# Patient Record
Sex: FEMALE | Race: WHITE | NOT HISPANIC OR LATINO
[De-identification: names, ages, dates, MRNs, and addresses within clinical notes are randomized per-mention and may not be internally consistent; named-entity substitution may affect disease eponyms.]

---

## 2019-11-12 PROBLEM — Z00.00 ENCOUNTER FOR PREVENTIVE HEALTH EXAMINATION: Status: ACTIVE | Noted: 2019-11-12

## 2019-11-19 ENCOUNTER — APPOINTMENT (OUTPATIENT)
Dept: SURGICAL ONCOLOGY | Facility: CLINIC | Age: 39
End: 2019-11-19
Payer: COMMERCIAL

## 2019-11-19 ENCOUNTER — OUTPATIENT (OUTPATIENT)
Dept: OUTPATIENT SERVICES | Facility: HOSPITAL | Age: 39
LOS: 1 days | End: 2019-11-19
Payer: COMMERCIAL

## 2019-11-19 VITALS
DIASTOLIC BLOOD PRESSURE: 65 MMHG | SYSTOLIC BLOOD PRESSURE: 106 MMHG | TEMPERATURE: 98.4 F | HEART RATE: 62 BPM | WEIGHT: 144 LBS | HEIGHT: 69 IN | BODY MASS INDEX: 21.33 KG/M2

## 2019-11-19 DIAGNOSIS — Z80.9 FAMILY HISTORY OF MALIGNANT NEOPLASM, UNSPECIFIED: ICD-10-CM

## 2019-11-19 DIAGNOSIS — Z01.818 ENCOUNTER FOR OTHER PREPROCEDURAL EXAMINATION: ICD-10-CM

## 2019-11-19 DIAGNOSIS — Z86.2 PERSONAL HISTORY OF DISEASES OF THE BLOOD AND BLOOD-FORMING ORGANS AND CERTAIN DISORDERS INVOLVING THE IMMUNE MECHANISM: ICD-10-CM

## 2019-11-19 DIAGNOSIS — F17.200 NICOTINE DEPENDENCE, UNSPECIFIED, UNCOMPLICATED: ICD-10-CM

## 2019-11-19 PROCEDURE — 93010 ELECTROCARDIOGRAM REPORT: CPT

## 2019-11-19 PROCEDURE — 99204 OFFICE O/P NEW MOD 45 MIN: CPT

## 2019-11-19 PROCEDURE — 93005 ELECTROCARDIOGRAM TRACING: CPT

## 2019-11-20 LAB
ABO + RH PNL BLD: NORMAL
ALBUMIN SERPL ELPH-MCNC: 4.9 G/DL
ALP BLD-CCNC: 53 U/L
ALT SERPL-CCNC: 11 U/L
ANION GAP SERPL CALC-SCNC: 16 MMOL/L
APTT BLD: 31.4 SEC
AST SERPL-CCNC: 14 U/L
BASOPHILS # BLD AUTO: 0.03 K/UL
BASOPHILS NFR BLD AUTO: 0.3 %
BILIRUB DIRECT SERPL-MCNC: 0.1 MG/DL
BILIRUB INDIRECT SERPL-MCNC: 0.4 MG/DL
BILIRUB SERPL-MCNC: 0.6 MG/DL
BUN SERPL-MCNC: 7 MG/DL
CALCIUM SERPL-MCNC: 10 MG/DL
CANCER AG19-9 SERPL-ACNC: 5 U/ML
CHLORIDE SERPL-SCNC: 104 MMOL/L
CO2 SERPL-SCNC: 22 MMOL/L
CREAT SERPL-MCNC: 0.62 MG/DL
EOSINOPHIL # BLD AUTO: 0.09 K/UL
EOSINOPHIL NFR BLD AUTO: 0.9 %
GLUCOSE SERPL-MCNC: 77 MG/DL
HCT VFR BLD CALC: 42.9 %
HGB BLD-MCNC: 13.4 G/DL
IMM GRANULOCYTES NFR BLD AUTO: 0.3 %
INR PPP: 1.02 RATIO
LYMPHOCYTES # BLD AUTO: 2.14 K/UL
LYMPHOCYTES NFR BLD AUTO: 22.4 %
MAN DIFF?: NORMAL
MCHC RBC-ENTMCNC: 30.2 PG
MCHC RBC-ENTMCNC: 31.2 GM/DL
MCV RBC AUTO: 96.8 FL
MONOCYTES # BLD AUTO: 0.56 K/UL
MONOCYTES NFR BLD AUTO: 5.9 %
NEUTROPHILS # BLD AUTO: 6.7 K/UL
NEUTROPHILS NFR BLD AUTO: 70.2 %
PLATELET # BLD AUTO: 138 K/UL
POTASSIUM SERPL-SCNC: 4.4 MMOL/L
PROT SERPL-MCNC: 7.1 G/DL
PT BLD: 11.7 SEC
RBC # BLD: 4.43 M/UL
RBC # FLD: 16.3 %
SODIUM SERPL-SCNC: 142 MMOL/L
WBC # FLD AUTO: 9.55 K/UL

## 2019-11-21 LAB — HCG SERPL-MCNC: <1 MIU/ML

## 2019-11-26 NOTE — PHYSICAL EXAM
[Normal] : supple, no neck mass and thyroid not enlarged [Normal] : oriented to person, place and time, with appropriate affect [de-identified] : Anicteric [de-identified] : S1,S2, regular rate and rhythm. No murmurs heard. [de-identified] : Clear throughout. No wheezes heard. [de-identified] : no palpable masses [de-identified] : warm and dry

## 2019-11-26 NOTE — RESULTS/DATA
[FreeTextEntry1] : Diagnostic Studies\par \par Date: 11/11/19\par Study: CT AP [facility] Pancreas Protocol ([facility])\par Results:  (1) 9.7x7.7cm cystic mass most likely arising from the tail of the pancreas. 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 (2) Otherwise unremarkable exam\par \par Date: 10/28/19\par Study: MR I Abdomen [facility]\par Results: Large cystic lesion within upper abdomen, as described above, indeterminate origin, possibly pancreatic origin. Recommend CT AP --- There is a T2 bright lesion measuring 6.6x8.6x7.9cm, insinuating itself between the left hepatic lobe, spleen, pancreas and stomach. The lesion is septated, There is a suggestion of a mural nodule, heterogenous. The liver is normal in size without focal hepatic lesions. No intrahepatic ductal dilatation is seen. The hepatic and portal veins are not well seen.  The gallbladder, common bile duct, pancreas, kidneys and adrenal glands are grossly unremarkable. Enlarged spleen measures 14.4cm in its longest span. The visualized bowel is unremarkable. No abdominal ascites or lymphadenopathy is identified. No signal abnormality related to osseous structures.\par \par \par Date:\par Study:\par Results:\par \par \par Date:\par Study:\par Results:\par \par Labs:\par \par Date:\par Results:\par \par Pathology:\par \par Date:\par Results:\par \par

## 2019-11-26 NOTE — ASSESSMENT
[FreeTextEntry1] : I) cystic pancreas mass\par \par P) Long discussion w patient about findings.Has a large cystic mass w nodules. 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 treatment of choice is resection. Would need a distal pancreatectomy and possible splenectomy. Will attempt minimally invasively but lesion is quite large and would not want to risk rupture so high likelihood of opening. Risks and benefits discussed, including but not limited to post bleeding, infection, pancreas fistula. All questions answered.Arrangements to be made.\par \par \par Reinaldo Burk MD\par \par Chief Surgical Oncology\par Multidisciplinary GI cancer program\par [facility] [place]\par [facility]\par \par Professor Surgery\par [employer]\par

## 2019-11-26 NOTE — HISTORY OF PRESENT ILLNESS
[de-identified] : Patient Name: AGNES REYES \par MRN: [id number] \par [place] MRN:\par Referring Provider:  Dr. Vj Garduno\par Oncologist:n/a\par Date: Nov 14, 2019 \par \par Diagnosis: Pancreas mass\par \par 40 yo for evaluation of a pancreas mass. Patient reports she had an abdominal ultrasound for unclear reasons and was told there was a mass on the left side of her abdomen. She was referred for MRI which was done 10/28 and this revealed a large cystic lesion in the upper abdomen possibly pancreatic origin. She was then referred for CT scan, done 11/11/19 revealing a 9.7cm cystic mass most likely arising from the tail of the pancreas. She has had no further testing and is now here for surgical consultation.\par \par Curently, Ms. REYES denies abdominal pain and discomfort, denies having decreased appetite, and denies nausea or vomiting. She denies changes in bowel habits and denies recent unintentional weight loss. She denies fevers, chills, or night sweats.\par \par Functional Status: Ms. REYES is able to walk 20 blocks without fatigue or dyspnea.\par

## 2019-12-03 VITALS
SYSTOLIC BLOOD PRESSURE: 108 MMHG | HEART RATE: 50 BPM | OXYGEN SATURATION: 98 % | WEIGHT: 142.2 LBS | RESPIRATION RATE: 16 BRPM | HEIGHT: 69 IN | TEMPERATURE: 98 F | DIASTOLIC BLOOD PRESSURE: 62 MMHG

## 2019-12-04 ENCOUNTER — RESULT REVIEW (OUTPATIENT)
Age: 39
End: 2019-12-04

## 2019-12-04 ENCOUNTER — INPATIENT (INPATIENT)
Facility: HOSPITAL | Age: 39
LOS: 3 days | Discharge: HOME | DRG: 407 | End: 2019-12-08
Attending: SURGERY | Admitting: SURGERY
Payer: COMMERCIAL

## 2019-12-04 ENCOUNTER — APPOINTMENT (OUTPATIENT)
Dept: SURGICAL ONCOLOGY | Facility: HOSPITAL | Age: 39
End: 2019-12-04

## 2019-12-04 LAB
ANION GAP SERPL CALC-SCNC: 11 MMOL/L — SIGNIFICANT CHANGE UP (ref 5–17)
BUN SERPL-MCNC: 5 MG/DL — LOW (ref 7–23)
CALCIUM SERPL-MCNC: 8.8 MG/DL — SIGNIFICANT CHANGE UP (ref 8.4–10.5)
CHLORIDE SERPL-SCNC: 105 MMOL/L — SIGNIFICANT CHANGE UP (ref 96–108)
CO2 SERPL-SCNC: 23 MMOL/L — SIGNIFICANT CHANGE UP (ref 22–31)
CREAT SERPL-MCNC: 0.61 MG/DL — SIGNIFICANT CHANGE UP (ref 0.5–1.3)
GLUCOSE SERPL-MCNC: 158 MG/DL — HIGH (ref 70–99)
HCT VFR BLD CALC: 40.3 % — SIGNIFICANT CHANGE UP (ref 34.5–45)
HGB BLD-MCNC: 12.9 G/DL — SIGNIFICANT CHANGE UP (ref 11.5–15.5)
MAGNESIUM SERPL-MCNC: 1.6 MG/DL — SIGNIFICANT CHANGE UP (ref 1.6–2.6)
MCHC RBC-ENTMCNC: 31 PG — SIGNIFICANT CHANGE UP (ref 27–34)
MCHC RBC-ENTMCNC: 32 GM/DL — SIGNIFICANT CHANGE UP (ref 32–36)
MCV RBC AUTO: 96.9 FL — SIGNIFICANT CHANGE UP (ref 80–100)
PHOSPHATE SERPL-MCNC: 2 MG/DL — LOW (ref 2.5–4.5)
PLATELET # BLD AUTO: 170 K/UL — SIGNIFICANT CHANGE UP (ref 150–400)
POTASSIUM SERPL-MCNC: 4 MMOL/L — SIGNIFICANT CHANGE UP (ref 3.5–5.3)
POTASSIUM SERPL-SCNC: 4 MMOL/L — SIGNIFICANT CHANGE UP (ref 3.5–5.3)
RBC # BLD: 4.16 M/UL — SIGNIFICANT CHANGE UP (ref 3.8–5.2)
RBC # FLD: 14.9 % — HIGH (ref 10.3–14.5)
SODIUM SERPL-SCNC: 139 MMOL/L — SIGNIFICANT CHANGE UP (ref 135–145)
WBC # BLD: 23.65 K/UL — HIGH (ref 3.8–10.5)
WBC # FLD AUTO: 23.65 K/UL — HIGH (ref 3.8–10.5)

## 2019-12-04 PROCEDURE — 88342 IMHCHEM/IMCYTCHM 1ST ANTB: CPT | Mod: 26

## 2019-12-04 PROCEDURE — 48999 UNLISTED PROCEDURE PANCREAS: CPT

## 2019-12-04 PROCEDURE — 88341 IMHCHEM/IMCYTCHM EA ADD ANTB: CPT | Mod: 26

## 2019-12-04 PROCEDURE — 88309 TISSUE EXAM BY PATHOLOGIST: CPT | Mod: 26

## 2019-12-04 PROCEDURE — 88305 TISSUE EXAM BY PATHOLOGIST: CPT | Mod: 26

## 2019-12-04 RX ORDER — DIPHENHYDRAMINE HCL 50 MG
25 CAPSULE ORAL ONCE
Refills: 0 | Status: COMPLETED | OUTPATIENT
Start: 2019-12-04 | End: 2019-12-04

## 2019-12-04 RX ORDER — HEPARIN SODIUM 5000 [USP'U]/ML
5000 INJECTION INTRAVENOUS; SUBCUTANEOUS EVERY 8 HOURS
Refills: 0 | Status: DISCONTINUED | OUTPATIENT
Start: 2019-12-04 | End: 2019-12-08

## 2019-12-04 RX ORDER — ONDANSETRON 8 MG/1
4 TABLET, FILM COATED ORAL EVERY 4 HOURS
Refills: 0 | Status: DISCONTINUED | OUTPATIENT
Start: 2019-12-04 | End: 2019-12-06

## 2019-12-04 RX ORDER — SODIUM CHLORIDE 9 MG/ML
1000 INJECTION, SOLUTION INTRAVENOUS
Refills: 0 | Status: DISCONTINUED | OUTPATIENT
Start: 2019-12-04 | End: 2019-12-08

## 2019-12-04 RX ORDER — HYDROMORPHONE HYDROCHLORIDE 2 MG/ML
0.3 INJECTION INTRAMUSCULAR; INTRAVENOUS; SUBCUTANEOUS
Refills: 0 | Status: DISCONTINUED | OUTPATIENT
Start: 2019-12-04 | End: 2019-12-05

## 2019-12-04 RX ORDER — BUPIVACAINE 13.3 MG/ML
20 INJECTION, SUSPENSION, LIPOSOMAL INFILTRATION ONCE
Refills: 0 | Status: DISCONTINUED | OUTPATIENT
Start: 2019-12-04 | End: 2019-12-04

## 2019-12-04 RX ORDER — METOPROLOL TARTRATE 50 MG
0 TABLET ORAL
Qty: 0 | Refills: 0 | DISCHARGE

## 2019-12-04 RX ADMIN — HYDROMORPHONE HYDROCHLORIDE 0.3 MILLIGRAM(S): 2 INJECTION INTRAMUSCULAR; INTRAVENOUS; SUBCUTANEOUS at 19:18

## 2019-12-04 RX ADMIN — HYDROMORPHONE HYDROCHLORIDE 0.3 MILLIGRAM(S): 2 INJECTION INTRAMUSCULAR; INTRAVENOUS; SUBCUTANEOUS at 19:33

## 2019-12-04 RX ADMIN — Medication 25 MILLIGRAM(S): at 23:07

## 2019-12-04 NOTE — PROGRESS NOTE ADULT - SUBJECTIVE AND OBJECTIVE BOX
Team 1 Surgery Post-Op Note, PCN:     Pre-Op Dx: neoplasm of pancreas  Procedure: Laparoscopic distal pancreatectomy with splenectomy    Surgeon: Wm    Subjective:  Pt seen and examined at bedside.  Pt is doing well, resting in bed. Pt reporting some back discomfort from bed. Pt denies abdominal pain, CP, SOB, nausea, vomiting.       Vital Signs Last 24 Hrs  T(C): 37.1 (04 Dec 2019 21:30), Max: 37.1 (04 Dec 2019 21:30)  T(F): 98.7 (04 Dec 2019 21:30), Max: 98.7 (04 Dec 2019 21:30)  HR: 51 (04 Dec 2019 21:30) (46 - 60)  BP: 119/69 (04 Dec 2019 21:30) (118/70 - 140/72)  RR: 16 (04 Dec 2019 21:30) (15 - 17)  SpO2: 95% (04 Dec 2019 21:30) (95% - 99%)    Physical Exam:  General: NAD, resting comfortably in bed  HEENT: NGT in place with minimal output  Pulmonary: Nonlabored breathing, no respiratory distress  Abdominal: soft, NT/ND, incisions clean dry and intact. ONELIA in place with serosanguinous output   Extremities: WWP, SCDs in place.     LABS:                        12.9   23.65 )-----------( 170      ( 04 Dec 2019 19:10 )             40.3     12-04    139  |  105  |  5<L>  ----------------------------<  158<H>  4.0   |  23  |  0.61    Ca    8.8      04 Dec 2019 19:10  Phos  2.0     12-04  Mg     1.6     12-04      ABO Interpretation: B (12-04 @ 10:28)

## 2019-12-04 NOTE — BRIEF OPERATIVE NOTE - OPERATION/FINDINGS
Candy abdmoinal access, placement of ports under direct vis, liver retractor placed. Entered lesser sac. Dissected pancreas off retroperitoneum. Splenic artery and vein ligated/transected with 45mm tan vascular stapler. Pancreas transected with 60mm purpler endogia stapler. Hemostasis obtained. Specimen retrieved via endocatch bag in Pfannenstiel incision. 19Fr hansa drain placed in pancreatic bed. Abdomen closed primarily.

## 2019-12-04 NOTE — H&P ADULT - ASSESSMENT
Pt is a 38 y/o F with iron deficiency anemia that presents today for a laparoscopic distal pancreatectomy and possible splenectomy. Pt underwent a routine abdominal sonogram in which they discovered a pancreatic mass. 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 No complaints today  - to OR   - admit to surgery team 1

## 2019-12-04 NOTE — H&P ADULT - HISTORY OF PRESENT ILLNESS
Pt is a 38 y/o F with iron deficiency anemia that presents today for a laparoscopic distal pancreatectomy and possible splenectomy. Pt underwent a routine abdominal sonogram in which they discovered a pancreatic mass. 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 No complaints today

## 2019-12-04 NOTE — BRIEF OPERATIVE NOTE - NSICDXBRIEFPROCEDURE_GEN_ALL_CORE_FT
PROCEDURES:  Laparoscopic distal pancreatectomy with splenectomy 04-Dec-2019 18:03:59  Javier Sutherland

## 2019-12-04 NOTE — PACU DISCHARGE NOTE - COMMENTS
Pt A&ox4, operative sites clean, dry and intact with 6 lap sites and one roberto to left lower abdomen; dilauded iv dose given one time post-op with verbalized comfort at present; plan of care endorsed to ANURADHA Kaye--dispo regional 3626.1

## 2019-12-05 LAB
ANION GAP SERPL CALC-SCNC: 9 MMOL/L — SIGNIFICANT CHANGE UP (ref 5–17)
BASOPHILS # BLD AUTO: 0.01 K/UL — SIGNIFICANT CHANGE UP (ref 0–0.2)
BASOPHILS NFR BLD AUTO: 0.1 % — SIGNIFICANT CHANGE UP (ref 0–2)
BUN SERPL-MCNC: 6 MG/DL — LOW (ref 7–23)
CALCIUM SERPL-MCNC: 8.7 MG/DL — SIGNIFICANT CHANGE UP (ref 8.4–10.5)
CHLORIDE SERPL-SCNC: 105 MMOL/L — SIGNIFICANT CHANGE UP (ref 96–108)
CO2 SERPL-SCNC: 26 MMOL/L — SIGNIFICANT CHANGE UP (ref 22–31)
CREAT SERPL-MCNC: 0.55 MG/DL — SIGNIFICANT CHANGE UP (ref 0.5–1.3)
EOSINOPHIL # BLD AUTO: 0 K/UL — SIGNIFICANT CHANGE UP (ref 0–0.5)
EOSINOPHIL NFR BLD AUTO: 0 % — SIGNIFICANT CHANGE UP (ref 0–6)
GLUCOSE SERPL-MCNC: 134 MG/DL — HIGH (ref 70–99)
HCT VFR BLD CALC: 38.8 % — SIGNIFICANT CHANGE UP (ref 34.5–45)
HGB BLD-MCNC: 12.2 G/DL — SIGNIFICANT CHANGE UP (ref 11.5–15.5)
IMM GRANULOCYTES NFR BLD AUTO: 0.4 % — SIGNIFICANT CHANGE UP (ref 0–1.5)
LYMPHOCYTES # BLD AUTO: 1.9 K/UL — SIGNIFICANT CHANGE UP (ref 1–3.3)
LYMPHOCYTES # BLD AUTO: 4 % — LOW (ref 13–44)
MAGNESIUM SERPL-MCNC: 1.7 MG/DL — SIGNIFICANT CHANGE UP (ref 1.6–2.6)
MCHC RBC-ENTMCNC: 30.3 PG — SIGNIFICANT CHANGE UP (ref 27–34)
MCHC RBC-ENTMCNC: 31.4 GM/DL — LOW (ref 32–36)
MCV RBC AUTO: 96.5 FL — SIGNIFICANT CHANGE UP (ref 80–100)
MONOCYTES # BLD AUTO: 1.88 K/UL — HIGH (ref 0–0.9)
MONOCYTES NFR BLD AUTO: 7 % — SIGNIFICANT CHANGE UP (ref 2–14)
NEUTROPHILS # BLD AUTO: 13.54 K/UL — HIGH (ref 1.8–7.4)
NEUTROPHILS NFR BLD AUTO: 89 % — HIGH (ref 43–77)
NRBC # BLD: 0 /100 WBCS — SIGNIFICANT CHANGE UP (ref 0–0)
PHOSPHATE SERPL-MCNC: 2.2 MG/DL — LOW (ref 2.5–4.5)
PLATELET # BLD AUTO: 172 K/UL — SIGNIFICANT CHANGE UP (ref 150–400)
POTASSIUM SERPL-MCNC: 3.8 MMOL/L — SIGNIFICANT CHANGE UP (ref 3.5–5.3)
POTASSIUM SERPL-SCNC: 3.8 MMOL/L — SIGNIFICANT CHANGE UP (ref 3.5–5.3)
RBC # BLD: 4.02 M/UL — SIGNIFICANT CHANGE UP (ref 3.8–5.2)
RBC # FLD: 15 % — HIGH (ref 10.3–14.5)
SODIUM SERPL-SCNC: 140 MMOL/L — SIGNIFICANT CHANGE UP (ref 135–145)
WBC # BLD: 17.4 K/UL — HIGH (ref 3.8–10.5)
WBC # FLD AUTO: 17.4 K/UL — HIGH (ref 3.8–10.5)

## 2019-12-05 RX ORDER — BENZOCAINE 10 %
1 GEL (GRAM) MUCOUS MEMBRANE ONCE
Refills: 0 | Status: COMPLETED | OUTPATIENT
Start: 2019-12-05 | End: 2019-12-05

## 2019-12-05 RX ORDER — POTASSIUM CHLORIDE 20 MEQ
10 PACKET (EA) ORAL
Refills: 0 | Status: DISCONTINUED | OUTPATIENT
Start: 2019-12-05 | End: 2019-12-05

## 2019-12-05 RX ORDER — HYDROMORPHONE HYDROCHLORIDE 2 MG/ML
30 INJECTION INTRAMUSCULAR; INTRAVENOUS; SUBCUTANEOUS
Refills: 0 | Status: DISCONTINUED | OUTPATIENT
Start: 2019-12-05 | End: 2019-12-06

## 2019-12-05 RX ORDER — POTASSIUM PHOSPHATE, MONOBASIC POTASSIUM PHOSPHATE, DIBASIC 236; 224 MG/ML; MG/ML
15 INJECTION, SOLUTION INTRAVENOUS ONCE
Refills: 0 | Status: COMPLETED | OUTPATIENT
Start: 2019-12-05 | End: 2019-12-05

## 2019-12-05 RX ORDER — MAGNESIUM SULFATE 500 MG/ML
2 VIAL (ML) INJECTION ONCE
Refills: 0 | Status: COMPLETED | OUTPATIENT
Start: 2019-12-05 | End: 2019-12-05

## 2019-12-05 RX ORDER — PANTOPRAZOLE SODIUM 20 MG/1
40 TABLET, DELAYED RELEASE ORAL DAILY
Refills: 0 | Status: DISCONTINUED | OUTPATIENT
Start: 2019-12-05 | End: 2019-12-08

## 2019-12-05 RX ORDER — NALOXONE HYDROCHLORIDE 4 MG/.1ML
0.1 SPRAY NASAL
Refills: 0 | Status: DISCONTINUED | OUTPATIENT
Start: 2019-12-05 | End: 2019-12-08

## 2019-12-05 RX ADMIN — SODIUM CHLORIDE 100 MILLILITER(S): 9 INJECTION, SOLUTION INTRAVENOUS at 21:03

## 2019-12-05 RX ADMIN — POTASSIUM PHOSPHATE, MONOBASIC POTASSIUM PHOSPHATE, DIBASIC 63.75 MILLIMOLE(S): 236; 224 INJECTION, SOLUTION INTRAVENOUS at 12:47

## 2019-12-05 RX ADMIN — HEPARIN SODIUM 5000 UNIT(S): 5000 INJECTION INTRAVENOUS; SUBCUTANEOUS at 13:28

## 2019-12-05 RX ADMIN — HEPARIN SODIUM 5000 UNIT(S): 5000 INJECTION INTRAVENOUS; SUBCUTANEOUS at 05:33

## 2019-12-05 RX ADMIN — HEPARIN SODIUM 5000 UNIT(S): 5000 INJECTION INTRAVENOUS; SUBCUTANEOUS at 21:02

## 2019-12-05 RX ADMIN — Medication 50 GRAM(S): at 05:32

## 2019-12-05 RX ADMIN — SODIUM CHLORIDE 100 MILLILITER(S): 9 INJECTION, SOLUTION INTRAVENOUS at 05:35

## 2019-12-05 RX ADMIN — ONDANSETRON 4 MILLIGRAM(S): 8 TABLET, FILM COATED ORAL at 09:49

## 2019-12-05 RX ADMIN — HYDROMORPHONE HYDROCHLORIDE 30 MILLILITER(S): 2 INJECTION INTRAMUSCULAR; INTRAVENOUS; SUBCUTANEOUS at 12:26

## 2019-12-05 RX ADMIN — PANTOPRAZOLE SODIUM 40 MILLIGRAM(S): 20 TABLET, DELAYED RELEASE ORAL at 12:47

## 2019-12-05 RX ADMIN — HYDROMORPHONE HYDROCHLORIDE 0.3 MILLIGRAM(S): 2 INJECTION INTRAMUSCULAR; INTRAVENOUS; SUBCUTANEOUS at 00:36

## 2019-12-05 RX ADMIN — HYDROMORPHONE HYDROCHLORIDE 0.3 MILLIGRAM(S): 2 INJECTION INTRAMUSCULAR; INTRAVENOUS; SUBCUTANEOUS at 05:34

## 2019-12-05 RX ADMIN — HYDROMORPHONE HYDROCHLORIDE 0.3 MILLIGRAM(S): 2 INJECTION INTRAMUSCULAR; INTRAVENOUS; SUBCUTANEOUS at 01:15

## 2019-12-05 NOTE — PHYSICAL THERAPY INITIAL EVALUATION ADULT - PHYSICAL ASSIST/NONPHYSICAL ASSIST, REHAB EVAL
1 person assist/verbal cues/HOB slightly elevated. VC's and hand held assist for log rolling to decrease discomfort with mobility/nonverbal cues (demo/gestures)

## 2019-12-05 NOTE — PHYSICAL THERAPY INITIAL EVALUATION ADULT - CRITERIA FOR SKILLED THERAPEUTIC INTERVENTIONS
functional limitations in following categories/risk reduction/prevention/anticipated discharge recommendation/rehab potential/therapy frequency/predicted duration of therapy intervention/anticipated equipment needs at discharge/impairments found

## 2019-12-05 NOTE — PHYSICAL THERAPY INITIAL EVALUATION ADULT - GENERAL OBSERVATIONS, REHAB EVAL
Chart reviewed, ANURADHA Doe cleared pt for PT, IE completed. Admitted for laparoscopic distal pancreatectomy and splenectomy 12/04/19. Recv'd mod supine, +NGT to wall suction, +JPx1, +yee, +abdominal dressing C/D/I, +heplock IV, pt denies SOB at rest or c activity, c/o nausea, 6/10 abdominal pain, & dizziness at rest. Naila session well, CG bed mobility, CG tranfers c hand held assist, CG amb 75ft x2 UE assist on IV pole. Impairments: dec balance, dec posture. Pt left in bedside chair as found lines/drains in tact, no new complaints. ANURADHA Doe aware. Pt will benefit from 1x f/u with PT. FIM gait=2. Chart reviewed, ANURADHA Doe cleared pt for PT, IE completed. Admitted for lap distal pancreatectomy and splenectomy 12/04/19. Recv'd mod supine, +NGT to wall suction, +JPx1, +yee, +abdominal dressing C/D/I, +heplock IV, pt denies SOB at rest or c activity, c/o nausea, 6/10 abdominal pain, & dizziness at rest. Naila session well, CG bed mobility, CG tranfers c hand held assist, CG amb 75ft x2 UE assist on IV pole. Impairments: pain, dec balance, dec posture. Pt left in bedside chair ...

## 2019-12-05 NOTE — PHYSICAL THERAPY INITIAL EVALUATION ADULT - PREDICTED DURATION OF THERAPY (DAYS/WKS), PT EVAL
Pt will benefit from 1 additional PT f/u to prevent deconditioning Pt will benefit from 1 additional PT f/u to maximize mobility and safety

## 2019-12-05 NOTE — PROGRESS NOTE ADULT - SUBJECTIVE AND OBJECTIVE BOX
INTERVAL HPI/OVERNIGHT EVENTS: NAEO. Post op labs stable. AFVSS. NGT in place.    STATUS POST:  distal pancreatectomy and splenectomy 12/4    POST OPERATIVE DAY #: 1    SUBJECTIVE: Pt seen and examined at bedside this am by surgery team. Pain is well controlled however wishes for NGT to come out. Will monitor OP. Denies f/n/v/cp/sob.    MEDICATIONS  (STANDING):  benzocaine 20% Spray 1 Spray(s) Topical once  heparin  Injectable 5000 Unit(s) SubCutaneous every 8 hours  HYDROmorphone PCA (1 mG/mL) 30 milliLiter(s) PCA Continuous PCA Continuous  lactated ringers. 1000 milliLiter(s) (100 mL/Hr) IV Continuous <Continuous>  pantoprazole  Injectable 40 milliGRAM(s) IV Push daily  potassium chloride  10 mEq/100 mL IVPB 10 milliEquivalent(s) IV Intermittent every 1 hour  potassium phosphate IVPB 15 milliMole(s) IV Intermittent once    MEDICATIONS  (PRN):  naloxone Injectable 0.1 milliGRAM(s) IV Push every 3 minutes PRN For ANY of the following changes in patient status:  A. RR LESS THAN 10 breaths per minute, B. Oxygen saturation LESS THAN 90%, C. Sedation score of 6  ondansetron Injectable 4 milliGRAM(s) IV Push every 4 hours PRN Nausea and/or Vomiting      Vital Signs Last 24 Hrs  T(C): 36.8 (05 Dec 2019 09:00), Max: 37.1 (04 Dec 2019 21:30)  T(F): 98.2 (05 Dec 2019 09:00), Max: 98.7 (04 Dec 2019 21:30)  HR: 60 (05 Dec 2019 09:00) (46 - 60)  BP: 120/78 (05 Dec 2019 09:00) (116/74 - 140/72)  BP(mean): --  RR: 16 (05 Dec 2019 09:00) (15 - 17)  SpO2: 95% (05 Dec 2019 05:34) (95% - 99%)    PHYSICAL EXAM:    Constitutional: A&Ox3, NAD    Respiratory: non labored breathing, no respiratory distress    Cardiovascular: NSR, RRR    Gastrointestinal: soft, NT/ND, incisions clean dry and intact. ONELIA in place with serosanguinous output     Genitourinary: Kenney in place draining clear yellow urine     Extremities: wwp, no calf tenderness or edema. SCDs in place      I&O's Detail    04 Dec 2019 07:01  -  05 Dec 2019 07:00  --------------------------------------------------------  IN:    IV PiggyBack: 50 mL    lactated ringers.: 1100 mL  Total IN: 1150 mL    OUT:    Bulb: 70 mL    Indwelling Catheter - Urethral: 295 mL    Nasoenteral Tube: 300 mL  Total OUT: 665 mL    Total NET: 485 mL      05 Dec 2019 07:01  -  05 Dec 2019 10:30  --------------------------------------------------------  IN:  Total IN: 0 mL    OUT:    Indwelling Catheter - Urethral: 500 mL  Total OUT: 500 mL    Total NET: -500 mL          LABS:                        12.2   17.40 )-----------( 172      ( 05 Dec 2019 06:25 )             38.8     12-05    140  |  105  |  6<L>  ----------------------------<  134<H>  3.8   |  26  |  0.55    Ca    8.7      05 Dec 2019 06:25  Phos  2.0     12-04  Mg     1.6     12-04            RADIOLOGY & ADDITIONAL STUDIES:

## 2019-12-05 NOTE — PHYSICAL THERAPY INITIAL EVALUATION ADULT - ADDITIONAL COMMENTS
Pt previously indpt with all activities, lives with 12 yo daughter in apartment building and elevator access. Pt states that she can get help if she needs it post [facility] d/c. Pt states she works (did not specify job title, "same as you"), is frequently in the community, walks to get around her neighborhood. Pt previously indpt with all activities, lives with 14 yo daughter in apartment building and elevator access. Pt states that she can get help if she needs it post [facility] d/c. Pt states she works (did not specify job title, "same as you"), is frequently in the community, community ambulator without limitation.

## 2019-12-05 NOTE — PHYSICAL THERAPY INITIAL EVALUATION ADULT - PERTINENT HX OF CURRENT PROBLEM, REHAB EVAL
38 y/o F with iron deficiency anemia that presents today for a laparoscopic distal pancreatectomy and possible splenectomy. Pt underwent a routine abdominal sonogram in which they discovered a pancreatic mass. 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

## 2019-12-06 LAB
ALBUMIN SERPL ELPH-MCNC: 3.7 G/DL — SIGNIFICANT CHANGE UP (ref 3.3–5)
ALP SERPL-CCNC: 54 U/L — SIGNIFICANT CHANGE UP (ref 40–120)
ALT FLD-CCNC: 11 U/L — SIGNIFICANT CHANGE UP (ref 10–45)
ANION GAP SERPL CALC-SCNC: 10 MMOL/L — SIGNIFICANT CHANGE UP (ref 5–17)
AST SERPL-CCNC: 14 U/L — SIGNIFICANT CHANGE UP (ref 10–40)
BASOPHILS # BLD AUTO: 0.03 K/UL — SIGNIFICANT CHANGE UP (ref 0–0.2)
BASOPHILS NFR BLD AUTO: 0.1 % — SIGNIFICANT CHANGE UP (ref 0–2)
BILIRUB SERPL-MCNC: 1 MG/DL — SIGNIFICANT CHANGE UP (ref 0.2–1.2)
BUN SERPL-MCNC: 5 MG/DL — LOW (ref 7–23)
CALCIUM SERPL-MCNC: 8.5 MG/DL — SIGNIFICANT CHANGE UP (ref 8.4–10.5)
CHLORIDE SERPL-SCNC: 101 MMOL/L — SIGNIFICANT CHANGE UP (ref 96–108)
CO2 SERPL-SCNC: 26 MMOL/L — SIGNIFICANT CHANGE UP (ref 22–31)
CREAT SERPL-MCNC: 0.54 MG/DL — SIGNIFICANT CHANGE UP (ref 0.5–1.3)
EOSINOPHIL # BLD AUTO: 0 K/UL — SIGNIFICANT CHANGE UP (ref 0–0.5)
EOSINOPHIL NFR BLD AUTO: 0 % — SIGNIFICANT CHANGE UP (ref 0–6)
GLUCOSE SERPL-MCNC: 105 MG/DL — HIGH (ref 70–99)
HCT VFR BLD CALC: 38.1 % — SIGNIFICANT CHANGE UP (ref 34.5–45)
HGB BLD-MCNC: 12.3 G/DL — SIGNIFICANT CHANGE UP (ref 11.5–15.5)
IMM GRANULOCYTES NFR BLD AUTO: 0.6 % — SIGNIFICANT CHANGE UP (ref 0–1.5)
LYMPHOCYTES # BLD AUTO: 10.9 % — LOW (ref 13–44)
LYMPHOCYTES # BLD AUTO: 2.51 K/UL — SIGNIFICANT CHANGE UP (ref 1–3.3)
MAGNESIUM SERPL-MCNC: 1.6 MG/DL — SIGNIFICANT CHANGE UP (ref 1.6–2.6)
MCHC RBC-ENTMCNC: 31.2 PG — SIGNIFICANT CHANGE UP (ref 27–34)
MCHC RBC-ENTMCNC: 32.3 GM/DL — SIGNIFICANT CHANGE UP (ref 32–36)
MCV RBC AUTO: 96.7 FL — SIGNIFICANT CHANGE UP (ref 80–100)
MONOCYTES # BLD AUTO: 2.61 K/UL — HIGH (ref 0–0.9)
MONOCYTES NFR BLD AUTO: 11.3 % — SIGNIFICANT CHANGE UP (ref 2–14)
NEUTROPHILS # BLD AUTO: 17.85 K/UL — HIGH (ref 1.8–7.4)
NEUTROPHILS NFR BLD AUTO: 77.1 % — HIGH (ref 43–77)
NRBC # BLD: 0 /100 WBCS — SIGNIFICANT CHANGE UP (ref 0–0)
PHOSPHATE SERPL-MCNC: 1.3 MG/DL — LOW (ref 2.5–4.5)
PLATELET # BLD AUTO: 189 K/UL — SIGNIFICANT CHANGE UP (ref 150–400)
POTASSIUM SERPL-MCNC: 3.8 MMOL/L — SIGNIFICANT CHANGE UP (ref 3.5–5.3)
POTASSIUM SERPL-SCNC: 3.8 MMOL/L — SIGNIFICANT CHANGE UP (ref 3.5–5.3)
PROT SERPL-MCNC: 6.3 G/DL — SIGNIFICANT CHANGE UP (ref 6–8.3)
RBC # BLD: 3.94 M/UL — SIGNIFICANT CHANGE UP (ref 3.8–5.2)
RBC # FLD: 15.2 % — HIGH (ref 10.3–14.5)
SODIUM SERPL-SCNC: 137 MMOL/L — SIGNIFICANT CHANGE UP (ref 135–145)
WBC # BLD: 23.13 K/UL — HIGH (ref 3.8–10.5)
WBC # FLD AUTO: 23.13 K/UL — HIGH (ref 3.8–10.5)

## 2019-12-06 RX ORDER — ACETAMINOPHEN 500 MG
1000 TABLET ORAL ONCE
Refills: 0 | Status: COMPLETED | OUTPATIENT
Start: 2019-12-06 | End: 2019-12-06

## 2019-12-06 RX ORDER — POTASSIUM PHOSPHATE, MONOBASIC POTASSIUM PHOSPHATE, DIBASIC 236; 224 MG/ML; MG/ML
21 INJECTION, SOLUTION INTRAVENOUS ONCE
Refills: 0 | Status: COMPLETED | OUTPATIENT
Start: 2019-12-06 | End: 2019-12-06

## 2019-12-06 RX ORDER — DIPHENHYDRAMINE HCL 50 MG
25 CAPSULE ORAL ONCE
Refills: 0 | Status: COMPLETED | OUTPATIENT
Start: 2019-12-06 | End: 2019-12-06

## 2019-12-06 RX ORDER — IBUPROFEN 200 MG
400 TABLET ORAL ONCE
Refills: 0 | Status: COMPLETED | OUTPATIENT
Start: 2019-12-06 | End: 2019-12-06

## 2019-12-06 RX ORDER — ONDANSETRON 8 MG/1
4 TABLET, FILM COATED ORAL ONCE
Refills: 0 | Status: COMPLETED | OUTPATIENT
Start: 2019-12-06 | End: 2019-12-06

## 2019-12-06 RX ORDER — HYDROMORPHONE HYDROCHLORIDE 2 MG/ML
30 INJECTION INTRAMUSCULAR; INTRAVENOUS; SUBCUTANEOUS
Refills: 0 | Status: DISCONTINUED | OUTPATIENT
Start: 2019-12-06 | End: 2019-12-07

## 2019-12-06 RX ORDER — ONDANSETRON 8 MG/1
4 TABLET, FILM COATED ORAL EVERY 6 HOURS
Refills: 0 | Status: COMPLETED | OUTPATIENT
Start: 2019-12-06 | End: 2019-12-07

## 2019-12-06 RX ORDER — MAGNESIUM SULFATE 500 MG/ML
2 VIAL (ML) INJECTION EVERY 6 HOURS
Refills: 0 | Status: COMPLETED | OUTPATIENT
Start: 2019-12-06 | End: 2019-12-06

## 2019-12-06 RX ADMIN — HEPARIN SODIUM 5000 UNIT(S): 5000 INJECTION INTRAVENOUS; SUBCUTANEOUS at 05:38

## 2019-12-06 RX ADMIN — HYDROMORPHONE HYDROCHLORIDE 30 MILLILITER(S): 2 INJECTION INTRAMUSCULAR; INTRAVENOUS; SUBCUTANEOUS at 15:57

## 2019-12-06 RX ADMIN — Medication 400 MILLIGRAM(S): at 13:48

## 2019-12-06 RX ADMIN — ONDANSETRON 4 MILLIGRAM(S): 8 TABLET, FILM COATED ORAL at 09:12

## 2019-12-06 RX ADMIN — ONDANSETRON 4 MILLIGRAM(S): 8 TABLET, FILM COATED ORAL at 08:55

## 2019-12-06 RX ADMIN — Medication 400 MILLIGRAM(S): at 15:57

## 2019-12-06 RX ADMIN — POTASSIUM PHOSPHATE, MONOBASIC POTASSIUM PHOSPHATE, DIBASIC 62.5 MILLIMOLE(S): 236; 224 INJECTION, SOLUTION INTRAVENOUS at 13:57

## 2019-12-06 RX ADMIN — Medication 50 GRAM(S): at 18:23

## 2019-12-06 RX ADMIN — Medication 25 MILLIGRAM(S): at 21:39

## 2019-12-06 RX ADMIN — Medication 50 GRAM(S): at 12:47

## 2019-12-06 RX ADMIN — Medication 400 MILLIGRAM(S): at 12:48

## 2019-12-06 RX ADMIN — Medication 1000 MILLIGRAM(S): at 16:12

## 2019-12-06 RX ADMIN — PANTOPRAZOLE SODIUM 40 MILLIGRAM(S): 20 TABLET, DELAYED RELEASE ORAL at 12:48

## 2019-12-06 RX ADMIN — ONDANSETRON 4 MILLIGRAM(S): 8 TABLET, FILM COATED ORAL at 21:18

## 2019-12-06 RX ADMIN — HEPARIN SODIUM 5000 UNIT(S): 5000 INJECTION INTRAVENOUS; SUBCUTANEOUS at 13:57

## 2019-12-06 RX ADMIN — ONDANSETRON 4 MILLIGRAM(S): 8 TABLET, FILM COATED ORAL at 16:03

## 2019-12-06 RX ADMIN — HEPARIN SODIUM 5000 UNIT(S): 5000 INJECTION INTRAVENOUS; SUBCUTANEOUS at 21:18

## 2019-12-06 RX ADMIN — Medication 400 MILLIGRAM(S): at 09:32

## 2019-12-06 NOTE — PROGRESS NOTE ADULT - SUBJECTIVE AND OBJECTIVE BOX
SUBJECTIVE: Patient seen and examined bedside by chief resident. Complaining of headache and NGT. NGT with 300cc overnight. Pain controlled. +nausea, given zofran.     heparin  Injectable 5000 Unit(s) SubCutaneous every 8 hours    MEDICATIONS  (PRN):  naloxone Injectable 0.1 milliGRAM(s) IV Push every 3 minutes PRN For ANY of the following changes in patient status:  A. RR LESS THAN 10 breaths per minute, B. Oxygen saturation LESS THAN 90%, C. Sedation score of 6      I&O's Detail    05 Dec 2019 07:01  -  06 Dec 2019 07:00  --------------------------------------------------------  IN:    lactated ringers.: 2400 mL  Total IN: 2400 mL    OUT:    Bulb: 120 mL    Indwelling Catheter - Urethral: 2650 mL    Nasoenteral Tube: 600 mL  Total OUT: 3370 mL    Total NET: -970 mL      06 Dec 2019 07:01  -  06 Dec 2019 14:02  --------------------------------------------------------  IN:  Total IN: 0 mL    OUT:    Indwelling Catheter - Urethral: 500 mL    Nasoenteral Tube: 100 mL  Total OUT: 600 mL    Total NET: -600 mL          Vital Signs Last 24 Hrs  T(C): 36.6 (06 Dec 2019 13:23), Max: 37.3 (06 Dec 2019 04:43)  T(F): 97.9 (06 Dec 2019 13:23), Max: 99.2 (06 Dec 2019 04:43)  HR: 53 (06 Dec 2019 13:23) (53 - 69)  BP: 138/76 (06 Dec 2019 13:23) (120/70 - 138/76)  BP(mean): --  RR: 17 (06 Dec 2019 13:23) (16 - 17)  SpO2: 95% (06 Dec 2019 13:23) (93% - 96%)    General: NAD, resting comfortably in bed  C/V: NSR  Pulm: Nonlabored breathing, no respiratory distress  Abd: soft, ND, NT, incisions c/d/i, ONELIA SS, NGT removed on rounds  Extrem: WWP, no edema, SCDs in place    LABS:                        12.3   23.13 )-----------( 189      ( 06 Dec 2019 07:22 )             38.1     12-06    137  |  101  |  5<L>  ----------------------------<  105<H>  3.8   |  26  |  0.54    Ca    8.5      06 Dec 2019 07:22  Phos  1.3     12-06  Mg     1.6     12-06    TPro  6.3  /  Alb  3.7  /  TBili  1.0  /  DBili  x   /  AST  14  /  ALT  11  /  AlkPhos  54  12-06          RADIOLOGY & ADDITIONAL STUDIES:

## 2019-12-07 LAB
ALBUMIN SERPL ELPH-MCNC: 3.5 G/DL — SIGNIFICANT CHANGE UP (ref 3.3–5)
ALP SERPL-CCNC: 57 U/L — SIGNIFICANT CHANGE UP (ref 40–120)
ALT FLD-CCNC: 9 U/L — LOW (ref 10–45)
ANION GAP SERPL CALC-SCNC: 12 MMOL/L — SIGNIFICANT CHANGE UP (ref 5–17)
AST SERPL-CCNC: 12 U/L — SIGNIFICANT CHANGE UP (ref 10–40)
BILIRUB SERPL-MCNC: 0.8 MG/DL — SIGNIFICANT CHANGE UP (ref 0.2–1.2)
BUN SERPL-MCNC: 6 MG/DL — LOW (ref 7–23)
CALCIUM SERPL-MCNC: 8.5 MG/DL — SIGNIFICANT CHANGE UP (ref 8.4–10.5)
CHLORIDE SERPL-SCNC: 103 MMOL/L — SIGNIFICANT CHANGE UP (ref 96–108)
CO2 SERPL-SCNC: 25 MMOL/L — SIGNIFICANT CHANGE UP (ref 22–31)
CREAT SERPL-MCNC: 0.58 MG/DL — SIGNIFICANT CHANGE UP (ref 0.5–1.3)
GLUCOSE SERPL-MCNC: 91 MG/DL — SIGNIFICANT CHANGE UP (ref 70–99)
HCT VFR BLD CALC: 34.8 % — SIGNIFICANT CHANGE UP (ref 34.5–45)
HGB BLD-MCNC: 10.9 G/DL — LOW (ref 11.5–15.5)
MAGNESIUM SERPL-MCNC: 1.8 MG/DL — SIGNIFICANT CHANGE UP (ref 1.6–2.6)
MCHC RBC-ENTMCNC: 30.9 PG — SIGNIFICANT CHANGE UP (ref 27–34)
MCHC RBC-ENTMCNC: 31.3 GM/DL — LOW (ref 32–36)
MCV RBC AUTO: 98.6 FL — SIGNIFICANT CHANGE UP (ref 80–100)
NRBC # BLD: 0 /100 WBCS — SIGNIFICANT CHANGE UP (ref 0–0)
PHOSPHATE SERPL-MCNC: 1.3 MG/DL — LOW (ref 2.5–4.5)
PLATELET # BLD AUTO: 186 K/UL — SIGNIFICANT CHANGE UP (ref 150–400)
POTASSIUM SERPL-MCNC: 3.7 MMOL/L — SIGNIFICANT CHANGE UP (ref 3.5–5.3)
POTASSIUM SERPL-SCNC: 3.7 MMOL/L — SIGNIFICANT CHANGE UP (ref 3.5–5.3)
PROT SERPL-MCNC: 5.9 G/DL — LOW (ref 6–8.3)
RBC # BLD: 3.53 M/UL — LOW (ref 3.8–5.2)
RBC # FLD: 14.8 % — HIGH (ref 10.3–14.5)
SODIUM SERPL-SCNC: 140 MMOL/L — SIGNIFICANT CHANGE UP (ref 135–145)
WBC # BLD: 18.16 K/UL — HIGH (ref 3.8–10.5)
WBC # FLD AUTO: 18.16 K/UL — HIGH (ref 3.8–10.5)

## 2019-12-07 RX ORDER — ACETAMINOPHEN 500 MG
1000 TABLET ORAL ONCE
Refills: 0 | Status: COMPLETED | OUTPATIENT
Start: 2019-12-07 | End: 2019-12-07

## 2019-12-07 RX ORDER — DIPHENHYDRAMINE HCL 50 MG
25 CAPSULE ORAL ONCE
Refills: 0 | Status: COMPLETED | OUTPATIENT
Start: 2019-12-07 | End: 2019-12-07

## 2019-12-07 RX ORDER — HYDROMORPHONE HYDROCHLORIDE 2 MG/ML
30 INJECTION INTRAMUSCULAR; INTRAVENOUS; SUBCUTANEOUS
Refills: 0 | Status: DISCONTINUED | OUTPATIENT
Start: 2019-12-07 | End: 2019-12-07

## 2019-12-07 RX ADMIN — HEPARIN SODIUM 5000 UNIT(S): 5000 INJECTION INTRAVENOUS; SUBCUTANEOUS at 21:01

## 2019-12-07 RX ADMIN — Medication 400 MILLIGRAM(S): at 01:11

## 2019-12-07 RX ADMIN — Medication 375 MILLIGRAM(S): at 11:40

## 2019-12-07 RX ADMIN — ONDANSETRON 4 MILLIGRAM(S): 8 TABLET, FILM COATED ORAL at 03:20

## 2019-12-07 RX ADMIN — Medication 1000 MILLIGRAM(S): at 01:41

## 2019-12-07 RX ADMIN — ONDANSETRON 4 MILLIGRAM(S): 8 TABLET, FILM COATED ORAL at 11:05

## 2019-12-07 RX ADMIN — HEPARIN SODIUM 5000 UNIT(S): 5000 INJECTION INTRAVENOUS; SUBCUTANEOUS at 05:20

## 2019-12-07 RX ADMIN — Medication 375 MILLIGRAM(S): at 20:28

## 2019-12-07 RX ADMIN — Medication 375 MILLIGRAM(S): at 19:28

## 2019-12-07 RX ADMIN — Medication 25 MILLIGRAM(S): at 01:12

## 2019-12-07 RX ADMIN — Medication 1000 MILLIGRAM(S): at 09:47

## 2019-12-07 RX ADMIN — Medication 375 MILLIGRAM(S): at 12:40

## 2019-12-07 RX ADMIN — PANTOPRAZOLE SODIUM 40 MILLIGRAM(S): 20 TABLET, DELAYED RELEASE ORAL at 11:52

## 2019-12-07 RX ADMIN — Medication 375 MILLIGRAM(S): at 22:10

## 2019-12-07 RX ADMIN — Medication 375 MILLIGRAM(S): at 21:02

## 2019-12-07 RX ADMIN — HEPARIN SODIUM 5000 UNIT(S): 5000 INJECTION INTRAVENOUS; SUBCUTANEOUS at 15:11

## 2019-12-07 NOTE — PROGRESS NOTE ADULT - SUBJECTIVE AND OBJECTIVE BOX
INTERVAL HPI: Patient seen and examined at bedside by chief resident. No acute events overnight.     heparin  Injectable 5000        Vital Signs Last 24 Hrs  T(C): 36.2 (07 Dec 2019 05:18), Max: 36.7 (06 Dec 2019 20:27)  T(F): 97.2 (07 Dec 2019 05:18), Max: 98.1 (06 Dec 2019 20:27)  HR: 72 (07 Dec 2019 05:18) (53 - 72)  BP: 112/71 (07 Dec 2019 05:18) (106/69 - 138/76)  BP(mean): --  RR: 17 (07 Dec 2019 05:18) (16 - 18)  SpO2: 96% (07 Dec 2019 05:18) (95% - 97%)  I&O's Summary    06 Dec 2019 07:01  -  07 Dec 2019 07:00  --------------------------------------------------------  IN: 2300 mL / OUT: 1911 mL / NET: 389 mL        Physical Exam:  General: NAD  Pulmonary: Nonlabored breathing, no respiratory distress  Cardiovascular: RRR  Abdominal: Soft, nontender, nondistended  Extremities: WWP, no edema        LABS:                        10.9   18.16 )-----------( 186      ( 07 Dec 2019 07:19 )             34.8     12-07    140  |  103  |  6<L>  ----------------------------<  91  3.7   |  25  |  0.58    Ca    8.5      07 Dec 2019 07:19  Phos  1.3     12-07  Mg     1.8     12-07    TPro  5.9<L>  /  Alb  3.5  /  TBili  0.8  /  DBili  x   /  AST  12  /  ALT  9<L>  /  AlkPhos  57  12-07          Assessment and Plan: INTERVAL HPI: Patient seen and examined at bedside by chief resident. No acute events overnight. Pt complaining of headaches that were difficult to control. Also complaining of nausea, that she attributes to the PCA pump she was on. Was NPO o/N and would like to be started on a diet. Denies any flatus or BM.     heparin  Injectable 5000        Vital Signs Last 24 Hrs  T(C): 36.2 (07 Dec 2019 05:18), Max: 36.7 (06 Dec 2019 20:27)  T(F): 97.2 (07 Dec 2019 05:18), Max: 98.1 (06 Dec 2019 20:27)  HR: 72 (07 Dec 2019 05:18) (53 - 72)  BP: 112/71 (07 Dec 2019 05:18) (106/69 - 138/76)  BP(mean): --  RR: 17 (07 Dec 2019 05:18) (16 - 18)  SpO2: 96% (07 Dec 2019 05:18) (95% - 97%)  I&O's Summary    06 Dec 2019 07:01  -  07 Dec 2019 07:00  --------------------------------------------------------  IN: 2300 mL / OUT: 1911 mL / NET: 389 mL        Physical Exam:  General: NAD  Pulmonary: Nonlabored breathing, no respiratory distress  Cardiovascular: RRR  Abdominal: Soft, nontender, nondistended, incision C/D/i  Extremities: WWP, no edema        LABS:                        10.9   18.16 )-----------( 186      ( 07 Dec 2019 07:19 )             34.8     12-07    140  |  103  |  6<L>  ----------------------------<  91  3.7   |  25  |  0.58    Ca    8.5      07 Dec 2019 07:19  Phos  1.3     12-07  Mg     1.8     12-07    TPro  5.9<L>  /  Alb  3.5  /  TBili  0.8  /  DBili  x   /  AST  12  /  ALT  9<L>  /  AlkPhos  57  12-07          Assessment and Plan:

## 2019-12-08 ENCOUNTER — TRANSCRIPTION ENCOUNTER (OUTPATIENT)
Age: 39
End: 2019-12-08

## 2019-12-08 VITALS
TEMPERATURE: 99 F | DIASTOLIC BLOOD PRESSURE: 69 MMHG | SYSTOLIC BLOOD PRESSURE: 116 MMHG | RESPIRATION RATE: 15 BRPM | HEART RATE: 63 BPM | OXYGEN SATURATION: 98 %

## 2019-12-08 LAB
ALBUMIN SERPL ELPH-MCNC: 3.4 G/DL — SIGNIFICANT CHANGE UP (ref 3.3–5)
ALP SERPL-CCNC: 76 U/L — SIGNIFICANT CHANGE UP (ref 40–120)
ALT FLD-CCNC: 5 U/L — LOW (ref 10–45)
ANION GAP SERPL CALC-SCNC: 12 MMOL/L — SIGNIFICANT CHANGE UP (ref 5–17)
AST SERPL-CCNC: 12 U/L — SIGNIFICANT CHANGE UP (ref 10–40)
BILIRUB SERPL-MCNC: 0.9 MG/DL — SIGNIFICANT CHANGE UP (ref 0.2–1.2)
BUN SERPL-MCNC: 5 MG/DL — LOW (ref 7–23)
CALCIUM SERPL-MCNC: 8.6 MG/DL — SIGNIFICANT CHANGE UP (ref 8.4–10.5)
CHLORIDE SERPL-SCNC: 103 MMOL/L — SIGNIFICANT CHANGE UP (ref 96–108)
CO2 SERPL-SCNC: 26 MMOL/L — SIGNIFICANT CHANGE UP (ref 22–31)
CREAT SERPL-MCNC: 0.56 MG/DL — SIGNIFICANT CHANGE UP (ref 0.5–1.3)
GLUCOSE SERPL-MCNC: 118 MG/DL — HIGH (ref 70–99)
HCT VFR BLD CALC: 34.2 % — LOW (ref 34.5–45)
HGB BLD-MCNC: 10.7 G/DL — LOW (ref 11.5–15.5)
MAGNESIUM SERPL-MCNC: 1.8 MG/DL — SIGNIFICANT CHANGE UP (ref 1.6–2.6)
MCHC RBC-ENTMCNC: 30.4 PG — SIGNIFICANT CHANGE UP (ref 27–34)
MCHC RBC-ENTMCNC: 31.3 GM/DL — LOW (ref 32–36)
MCV RBC AUTO: 97.2 FL — SIGNIFICANT CHANGE UP (ref 80–100)
NRBC # BLD: 0 /100 WBCS — SIGNIFICANT CHANGE UP (ref 0–0)
PHOSPHATE SERPL-MCNC: 2.9 MG/DL — SIGNIFICANT CHANGE UP (ref 2.5–4.5)
PLATELET # BLD AUTO: 216 K/UL — SIGNIFICANT CHANGE UP (ref 150–400)
POTASSIUM SERPL-MCNC: 4 MMOL/L — SIGNIFICANT CHANGE UP (ref 3.5–5.3)
POTASSIUM SERPL-SCNC: 4 MMOL/L — SIGNIFICANT CHANGE UP (ref 3.5–5.3)
PROT SERPL-MCNC: 6.3 G/DL — SIGNIFICANT CHANGE UP (ref 6–8.3)
RBC # BLD: 3.52 M/UL — LOW (ref 3.8–5.2)
RBC # FLD: 14.6 % — HIGH (ref 10.3–14.5)
SODIUM SERPL-SCNC: 141 MMOL/L — SIGNIFICANT CHANGE UP (ref 135–145)
WBC # BLD: 12.97 K/UL — HIGH (ref 3.8–10.5)
WBC # FLD AUTO: 12.97 K/UL — HIGH (ref 3.8–10.5)

## 2019-12-08 RX ORDER — ACETAMINOPHEN 500 MG
2 TABLET ORAL
Qty: 64 | Refills: 0
Start: 2019-12-08 | End: 2019-12-15

## 2019-12-08 RX ORDER — HYDROMORPHONE HYDROCHLORIDE 2 MG/ML
1 INJECTION INTRAMUSCULAR; INTRAVENOUS; SUBCUTANEOUS
Qty: 12 | Refills: 0
Start: 2019-12-08 | End: 2019-12-10

## 2019-12-08 RX ORDER — POTASSIUM PHOSPHATE, MONOBASIC POTASSIUM PHOSPHATE, DIBASIC 236; 224 MG/ML; MG/ML
30 INJECTION, SOLUTION INTRAVENOUS ONCE
Refills: 0 | Status: COMPLETED | OUTPATIENT
Start: 2019-12-08 | End: 2019-12-08

## 2019-12-08 RX ORDER — MAGNESIUM SULFATE 500 MG/ML
1 VIAL (ML) INJECTION ONCE
Refills: 0 | Status: COMPLETED | OUTPATIENT
Start: 2019-12-08 | End: 2019-12-08

## 2019-12-08 RX ADMIN — Medication 375 MILLIGRAM(S): at 17:00

## 2019-12-08 RX ADMIN — POTASSIUM PHOSPHATE, MONOBASIC POTASSIUM PHOSPHATE, DIBASIC 83.33 MILLIMOLE(S): 236; 224 INJECTION, SOLUTION INTRAVENOUS at 05:11

## 2019-12-08 RX ADMIN — HEPARIN SODIUM 5000 UNIT(S): 5000 INJECTION INTRAVENOUS; SUBCUTANEOUS at 05:11

## 2019-12-08 RX ADMIN — Medication 100 GRAM(S): at 00:59

## 2019-12-08 RX ADMIN — Medication 375 MILLIGRAM(S): at 16:09

## 2019-12-08 RX ADMIN — HEPARIN SODIUM 5000 UNIT(S): 5000 INJECTION INTRAVENOUS; SUBCUTANEOUS at 13:21

## 2019-12-08 NOTE — DISCHARGE NOTE PROVIDER - NSDCFUADDINST_GEN_ALL_CORE_FT
-Continue a low residue diet   -Activity: no heavy lifting or strenuous exercise for one month.  -You may shower but no soaking baths, no swimming pools.  -Notify physician for fever greater than 101, worsening abdominal pain, bleeding or drainage from incision sites.   -Follow-up with Dr. Burk 1 week .Call the office to make an appointment.  -  Please take Tylenol 650 mg every 6 hours by mouth for moderate to severe pain control.   -  Please take Dilaudid 2 mg every 6 hours as needed for unbearable pain.

## 2019-12-08 NOTE — DISCHARGE NOTE PROVIDER - NSDCCPGOAL_GEN_ALL_CORE_FT
To get better and follow your care plan as instructed.  -Continue a low residue diet   -Activity: no heavy lifting or strenuous exercise for one month.  -You may shower but no soaking baths, no swimming pools.  -Notify physician for fever greater than 101, worsening abdominal pain, bleeding or drainage from incision sites.   -Follow-up with Dr. Burk 1 week .Call the office to make an appointment.  -  Please take Tylenol 650 mg every 6 hours by mouth for moderate to severe pain control.   -  Please take Dilaudid 2 mg every 6 hours as needed for unbearable pain.

## 2019-12-08 NOTE — DISCHARGE NOTE PROVIDER - NSDCMRMEDTOKEN_GEN_ALL_CORE_FT
Dilaudid 2 mg oral tablet: 1 tab(s) orally every 6 hours, As Needed -for severe pain MDD:4   metoprolol tartrate 25 mg oral tablet: orally once  Tylenol 325 mg oral tablet: 2 tab(s) orally every 6 hours, As Needed -for moderate pain - for severe pain MDD:8 tablets

## 2019-12-08 NOTE — DISCHARGE NOTE PROVIDER - HOSPITAL COURSE
40 y/o F with iron deficiency anemia that presents today for a laparoscopic distal pancreatectomy and possible splenectomy. Pt underwent a routine abdominal sonogram in which they discovered a pancreatic mass. 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 12/4: distal pancreatectomy and splenectomy. Pt tolerated the procedure well. At time of discharge, pt was tolerating a low residue  diet, and pt's pain was controlled. Plan is to follow up with Dr. Burk in the office.

## 2019-12-08 NOTE — DISCHARGE NOTE PROVIDER - NSDCACTIVITY_GEN_ALL_CORE
Stairs allowed/Walking - Outdoors allowed/Showering allowed/No heavy lifting/straining/Walking - Indoors allowed

## 2019-12-08 NOTE — PROGRESS NOTE ADULT - ASSESSMENT
38 y/o F with iron deficiency anemia that presents today for a laparoscopic distal pancreatectomy and possible splenectomy. Pt underwent a routine abdominal sonogram in which they discovered a pancreatic mass. 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
38 y/o F with iron deficiency anemia that presents today for a laparoscopic distal pancreatectomy and possible splenectomy. Pt underwent a routine abdominal sonogram in which they discovered a pancreatic mass. 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
40 y/o F with iron deficiency anemia that presents today for a laparoscopic distal pancreatectomy and possible splenectomy. Pt underwent a routine abdominal sonogram in which they discovered a pancreatic mass. 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
40 y/o F with iron deficiency anemia that presents today for a laparoscopic distal pancreatectomy and possible splenectomy. Pt underwent a routine abdominal sonogram in which they discovered a pancreatic mass. 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
38 y/o F with iron deficiency anemia that presented for a laparoscopic distal pancreatectomy and splenectomy. Pt underwent a routine abdominal sonogram in which they discovered a pancreatic mass. 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

## 2019-12-08 NOTE — DISCHARGE NOTE PROVIDER - NSDCCPCAREPLAN_GEN_ALL_CORE_FT
PRINCIPAL DISCHARGE DIAGNOSIS  Diagnosis: Pancreatic cyst  Assessment and Plan of Treatment: 38 y/o F with iron deficiency anemia that presents today for a laparoscopic distal pancreatectomy and possible splenectomy. Pt underwent a routine abdominal sonogram in which they discovered a pancreatic mass. 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 12/4: distal pancreatectomy and splenectomy. Pt tolerated the procedure well.

## 2019-12-08 NOTE — PROGRESS NOTE ADULT - SUBJECTIVE AND OBJECTIVE BOX
OVERNIGHT EVENTS: adv to LRD, still has headache, naproxen helping  12/7: adv to [facility], d/c yee, passed TOV    STATUS POST:   distal pancreatectomy and splenectomy.    POST OPERATIVE DAY #: 4    SUBJECTIVE: Patient examined bedside with chief resident. Patient  reports pain improved and tolerating diet. Patient reports headache resolved with naproxen.  Patient denies nausea, vomiting, chest pain and SOB. Patient making adequate urine output.      heparin  Injectable 5000 Unit(s) SubCutaneous every 8 hours      Vital Signs Last 24 Hrs  T(C): 36.8 (08 Dec 2019 09:10), Max: 36.9 (07 Dec 2019 20:18)  T(F): 98.3 (08 Dec 2019 09:10), Max: 98.5 (07 Dec 2019 20:18)  HR: 66 (08 Dec 2019 09:10) (48 - 70)  BP: 103/64 (08 Dec 2019 09:10) (103/64 - 120/71)  BP(mean): --  RR: 16 (08 Dec 2019 09:10) (15 - 17)  SpO2: 97% (08 Dec 2019 09:10) (96% - 98%)  I&O's Detail    07 Dec 2019 07:01  -  08 Dec 2019 07:00  --------------------------------------------------------  IN:    IV PiggyBack: 340 mL    lactated ringers.: 2200 mL    Oral Fluid: 120 mL  Total IN: 2660 mL    OUT:    Bulb: 40 mL    Voided: 1600 mL  Total OUT: 1640 mL    Total NET: 1020 mL          General: NAD, resting comfortably in bed  C/V: NSR  Pulm: Nonlabored breathing, no respiratory distress  Abd: Soft, non-distended, TTP around incision site, incision clean dry and intact  Extrem: WWP, no edema, SCDs in place  Drains: minimal serosanguineous output       LABS:                        10.7   12.97 )-----------( 216      ( 08 Dec 2019 07:28 )             34.2     12-08    141  |  103  |  5<L>  ----------------------------<  118<H>  4.0   |  26  |  0.56    Ca    8.6      08 Dec 2019 07:28  Phos  2.9     12-08  Mg     1.8     12-08    TPro  6.3  /  Alb  3.4  /  TBili  0.9  /  DBili  x   /  AST  12  /  ALT  5<L>  /  AlkPhos  76  12-08

## 2019-12-08 NOTE — DISCHARGE NOTE NURSING/CASE MANAGEMENT/SOCIAL WORK - PATIENT PORTAL LINK FT
You can access the FollowMyHealth Patient Portal offered by [facility] by registering at the following website: [email or web address]. By joining [facility]’s FollowMyHealth portal, you will also be able to view your health information using other applications (apps) compatible with our system.

## 2019-12-09 PROBLEM — F17.200 NICOTINE DEPENDENCE, UNSPECIFIED, UNCOMPLICATED: Chronic | Status: ACTIVE | Noted: 2019-12-03

## 2019-12-09 PROBLEM — D64.9 ANEMIA, UNSPECIFIED: Chronic | Status: ACTIVE | Noted: 2019-12-03

## 2019-12-13 LAB — SURGICAL PATHOLOGY STUDY: SIGNIFICANT CHANGE UP

## 2019-12-16 PROCEDURE — C1889: CPT

## 2019-12-16 PROCEDURE — 80048 BASIC METABOLIC PNL TOTAL CA: CPT

## 2019-12-16 PROCEDURE — 88305 TISSUE EXAM BY PATHOLOGIST: CPT

## 2019-12-16 PROCEDURE — 88342 IMHCHEM/IMCYTCHM 1ST ANTB: CPT

## 2019-12-16 PROCEDURE — 86901 BLOOD TYPING SEROLOGIC RH(D): CPT

## 2019-12-16 PROCEDURE — 84100 ASSAY OF PHOSPHORUS: CPT

## 2019-12-16 PROCEDURE — 88309 TISSUE EXAM BY PATHOLOGIST: CPT

## 2019-12-16 PROCEDURE — 97530 THERAPEUTIC ACTIVITIES: CPT

## 2019-12-16 PROCEDURE — 80053 COMPREHEN METABOLIC PANEL: CPT

## 2019-12-16 PROCEDURE — 97161 PT EVAL LOW COMPLEX 20 MIN: CPT

## 2019-12-16 PROCEDURE — 85027 COMPLETE CBC AUTOMATED: CPT

## 2019-12-16 PROCEDURE — 85025 COMPLETE CBC W/AUTO DIFF WBC: CPT

## 2019-12-16 PROCEDURE — 83735 ASSAY OF MAGNESIUM: CPT

## 2019-12-16 PROCEDURE — 36415 COLL VENOUS BLD VENIPUNCTURE: CPT

## 2019-12-16 PROCEDURE — 86850 RBC ANTIBODY SCREEN: CPT

## 2019-12-16 PROCEDURE — 88341 IMHCHEM/IMCYTCHM EA ADD ANTB: CPT

## 2019-12-16 PROCEDURE — 86900 BLOOD TYPING SEROLOGIC ABO: CPT

## 2019-12-17 ENCOUNTER — APPOINTMENT (OUTPATIENT)
Dept: SURGICAL ONCOLOGY | Facility: CLINIC | Age: 39
End: 2019-12-17
Payer: COMMERCIAL

## 2019-12-17 VITALS
TEMPERATURE: 98.1 F | DIASTOLIC BLOOD PRESSURE: 74 MMHG | SYSTOLIC BLOOD PRESSURE: 119 MMHG | HEIGHT: 69 IN | WEIGHT: 133 LBS | BODY MASS INDEX: 19.7 KG/M2 | HEART RATE: 54 BPM

## 2019-12-17 PROCEDURE — 99024 POSTOP FOLLOW-UP VISIT: CPT

## 2019-12-17 NOTE — ASSESSMENT
[FreeTextEntry1] : I) normal postop\par \par P) Will see us again in 3 months. Does not need additional therapy. Will monitor glucose and complete splenic vaccinations.\par \par Reinaldo Burk MD\par \par Chief Surgical Oncology\par Multidisciplinary GI cancer program\par [facility]\par [facility]\par \par Professor Surgery\par [facility] [employer]\par \par cc Dr Garduno

## 2019-12-17 NOTE — HISTORY OF PRESENT ILLNESS
[FreeTextEntry1] : Patient Name: AGNES REYES \par MRN: [id number] \par [place] MRN: [id number] \par Referring Provider: Dr. Vj Garduno\par Oncologist:\par Date: Dec 12, 2019 \par \par Diagnosis: Pancreas mass\par Operative Date: 12/4/19\par Procedure: Lap distal pancreatectomy with splenectomy\par \par 13 days post op and feels well overall. Surgery challenging due to the size but able to resect laparoscopically and extract thru a Pfannenstiel incision.\par \par Currently, Ms. REYES denies abdominal pain and discomfort. She denies fevers, chills, or night sweats. She is tolerating a regular diet and is having regular bowel movements. 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

## 2019-12-17 NOTE — PHYSICAL EXAM
[Normal] : well developed, well nourished, in no acute distress [de-identified] : soft, non tender, surgical sites are healing well without signs of infection

## 2019-12-18 DIAGNOSIS — K86.2 CYST OF PANCREAS: ICD-10-CM

## 2019-12-18 DIAGNOSIS — D50.9 IRON DEFICIENCY ANEMIA, UNSPECIFIED: ICD-10-CM

## 2019-12-18 DIAGNOSIS — E83.39 OTHER DISORDERS OF PHOSPHORUS METABOLISM: ICD-10-CM

## 2019-12-18 DIAGNOSIS — Z87.891 PERSONAL HISTORY OF NICOTINE DEPENDENCE: ICD-10-CM

## 2019-12-19 LAB — GLUCOSE SERPL-MCNC: 101 MG/DL

## 2020-08-10 ENCOUNTER — APPOINTMENT (OUTPATIENT)
Dept: SURGICAL ONCOLOGY | Facility: CLINIC | Age: 40
End: 2020-08-10

## 2020-08-17 ENCOUNTER — APPOINTMENT (OUTPATIENT)
Dept: SURGICAL ONCOLOGY | Facility: CLINIC | Age: 40
End: 2020-08-17
Payer: COMMERCIAL

## 2020-08-17 VITALS
HEIGHT: 69 IN | WEIGHT: 139 LBS | DIASTOLIC BLOOD PRESSURE: 78 MMHG | TEMPERATURE: 98.1 F | BODY MASS INDEX: 20.59 KG/M2 | SYSTOLIC BLOOD PRESSURE: 122 MMHG | OXYGEN SATURATION: 99 % | HEART RATE: 53 BPM

## 2020-08-17 DIAGNOSIS — K86.2 CYST OF PANCREAS: ICD-10-CM

## 2020-08-17 PROCEDURE — 99214 OFFICE O/P EST MOD 30 MIN: CPT

## 2020-08-17 NOTE — HISTORY OF PRESENT ILLNESS
[de-identified] : Patient Name: AGNES REYES \par MRN: [id number] \par [place] MRN: [id number] \par Referring Provider:  Dr. Vj Garduno\par Oncologist:n/a\par Date:  8/17/2020\par \par Diagnosis: Pancreas mass\par Operative Date: 12/4/19\par Procedure: Lap distal pancreatectomy with splenectomy\par Pathology: MCN with focal high grade dysplasia\par \par She presents for follow up. She is 8 months post op. She saw her PCP and had an abdominal ultrasound because she has been complaining of left sided abdominal discomfort, weakness, and fatigue for about a month. \par \par Curently, Ms. REYES admits having decreased appetite, and admits to nausea but no vomiting. She denies changes in bowel habits and has lost about 3-4 lbs over the past month or two. She denies fevers, chills, or night sweats.

## 2020-08-17 NOTE — ASSESSMENT
[FreeTextEntry1] : I) s/p distal pancreatectomy/splenectomy\par \par P) Based on ultrasound description may have some residual fluid collection which is not unusual, however would be surprised if causing the symptoms she is describing. Will need CT scan to better delineate. Will set up today.\par \par Reinaldo Burk MD\par \par Chief Surgical Oncology\par Multidisciplinary GI cancer program\par [facility] [place]\par [facility]\par \par Professor Surgery\par [employer] [facility] [employer]\par

## 2020-08-17 NOTE — PHYSICAL EXAM
[Normal Neck Lymph Nodes] : normal neck lymph nodes  [Normal Supraclavicular Lymph Nodes] : normal supraclavicular lymph nodes [Normal] : oriented to person, place and time, with appropriate affect [de-identified] : Anicteric [de-identified] : Soft, tenderness to LLQ with deep palpation, no palpable masses [de-identified] : Clear throughout. No wheezes heard. [de-identified] : S1,S2, regular rate and rhythm. No murmurs heard. [de-identified] : Warm and dry

## 2020-08-17 NOTE — RESULTS/DATA
[FreeTextEntry1] : Diagnostic Studies\par \par Date: 8/3/2020\par Study: Ultrasound\par Results: PRELIMINARY: three is an anechoic lesion with well defined borders 4.5 x 2.3 cm at the area of spleen. Probably some fluid collection\par \par Labs:\par Date: 7/20/2020\par Results: WBC 10.7\par \par Pathology: No new results to review\par \par \par

## 2022-02-16 ENCOUNTER — NON-APPOINTMENT (OUTPATIENT)
Age: 42
End: 2022-02-16

## 2022-03-21 ENCOUNTER — APPOINTMENT (OUTPATIENT)
Dept: SURGICAL ONCOLOGY | Facility: CLINIC | Age: 42
End: 2022-03-21
Payer: MEDICAID

## 2022-04-04 ENCOUNTER — APPOINTMENT (OUTPATIENT)
Dept: SURGICAL ONCOLOGY | Facility: CLINIC | Age: 42
End: 2022-04-04
Payer: MEDICAID

## 2022-04-04 VITALS
SYSTOLIC BLOOD PRESSURE: 104 MMHG | TEMPERATURE: 97.5 F | HEART RATE: 65 BPM | DIASTOLIC BLOOD PRESSURE: 62 MMHG | WEIGHT: 152 LBS | HEIGHT: 69 IN | OXYGEN SATURATION: 96 % | BODY MASS INDEX: 22.51 KG/M2

## 2022-04-04 PROCEDURE — 99213 OFFICE O/P EST LOW 20 MIN: CPT

## 2022-04-04 NOTE — PHYSICAL EXAM
[Normal Neck Lymph Nodes] : normal neck lymph nodes  [Normal] : oriented to person, place and time, with appropriate affect [de-identified] : anicteric [de-identified] : Clear throughout. No wheezes heard. [de-identified] : S1,S2, regular rate and rhythm. No murmurs heard. [de-identified] : warm, dry

## 2022-04-04 NOTE — HISTORY OF PRESENT ILLNESS
[de-identified] : Patient Name: AGNES REYES \par MRN: [id number] \par [place] MRN: [id number] \par Referring Provider:  Dr. Vj Garduno\par Oncologist:n/a\par Date:  4/4/22\par \par Diagnosis: Pancreas mass\par Operative Date: 12/4/19\par Procedure: Lap distal pancreatectomy with splenectomy\par Pathology: MCN with focal high grade dysplasia\par \par She presents for follow up. She is 2 years 3 months post op. Her last imaging study was in November 2021.\par \par Curently, Ms. REYES feels well. She occasionally has left sided abdominal pain but it is random. She denies any changes to bowel movements or changes in appetite. The only change to her medical history is that she is anemic and gets IV iron infusions monthly.

## 2022-04-04 NOTE — RESULTS/DATA
[FreeTextEntry1] : Diagnostic Studies\par \par Date: 11/29/21\par Study: CT AP W WO\par Results:No hydroureteronephrosis. Adjacent right posterolateral to bladder, there is a tiny calcific density on axial view, difficult to precisely localize but felt to be adjacent rather than within ureter on sagittal view, probably represent venous phlebolith\par

## 2022-04-04 NOTE — ASSESSMENT
[FreeTextEntry1] : I) KAMAR\par \par P) Will obtain follow up MRI Dec 2022 which will be 3 years since surgery and then we can stop following if all ok.\par \par Reinaldo Burk MD\par \par Chief Surgical Oncology\par Multidisciplinary GI cancer program\par [facility] [place]\par [facility]\par \par Professor Surgery\par [facility] [employer]\par \par cc Dr Vj Garduno

## 2022-06-13 NOTE — PHYSICAL THERAPY INITIAL EVALUATION ADULT - SKIN INTEGRITY
Standing/Walking/Toileting abdominal dressing C/D/I abdominal dressing C/D/I, lap sites without signs of infection

## 2022-11-28 DIAGNOSIS — K86.9 DISEASE OF PANCREAS, UNSPECIFIED: ICD-10-CM

## 2023-01-09 ENCOUNTER — APPOINTMENT (OUTPATIENT)
Dept: SURGICAL ONCOLOGY | Facility: CLINIC | Age: 43
End: 2023-01-09
Payer: MEDICAID

## 2023-01-09 VITALS
SYSTOLIC BLOOD PRESSURE: 105 MMHG | DIASTOLIC BLOOD PRESSURE: 71 MMHG | TEMPERATURE: 97.6 F | BODY MASS INDEX: 21.18 KG/M2 | OXYGEN SATURATION: 100 % | HEIGHT: 69 IN | HEART RATE: 63 BPM | WEIGHT: 143 LBS

## 2023-01-09 DIAGNOSIS — Z98.890 OTHER SPECIFIED POSTPROCEDURAL STATES: ICD-10-CM

## 2023-01-09 PROCEDURE — 99212 OFFICE O/P EST SF 10 MIN: CPT

## 2023-01-09 NOTE — PHYSICAL EXAM
[Normal] : supple, no neck mass and thyroid not enlarged [Normal] : oriented to person, place and time, with appropriate affect [de-identified] : Incision sites well healed.

## 2023-01-09 NOTE — ASSESSMENT
[FreeTextEntry1] : I) KAMAR\par \par P) Is now over 3 years since surgery. This was an MCN and as such should not be a long term risk for PDAC in the gland remnant (as we see in IPMN). She only had HGD and no invasion. We can see her as needed.\par \par Reinaldo Burk MD\par \par Chief Surgical Oncology\par Multidisciplinary GI cancer program\par [facility] [place]\par [facility]\par \par Professor Surgery\par [facility] [employer]\par

## 2023-01-09 NOTE — RESULTS/DATA
[FreeTextEntry1] : Date: 12/29/22\par Study: MRI Abdomen (R)\par Results: IMPRESSION:\par Stable postoperative appearance of the pancreas with the residual pancreas is unremarkable.\par Prominent ingested material distending the stomach. Clinical correlation for symptoms of delayed gastric emptying.

## 2023-01-09 NOTE — HISTORY OF PRESENT ILLNESS
[de-identified] : Patient Name: AGNES REYES \par MRN: [id number] \par [place] MRN: [id number] \par Referring Provider:  Dr. Vj Garduno\par Oncologist:n/a\par Date:  1/9/23\par \par Diagnosis: Pancreas mass\par Operative Date: 12/4/19\par Procedure: Lap distal pancreatectomy with splenectomy\par Pathology: MCN with focal high grade dysplasia\par \par She presents for follow up. She is 3 years post op. She had an MRI 12/19/22 and is here to review results nand discuss follow up. \par \par She feels well today still complains of decreased appetite and mild discomfort on the left flank when sitting for long periods in certain positions. She denies abdominal pain, nausea, vomiting, fevers, chills. Denies changes to bowel movements or urination. \par \par She stopped her IV iron infusions approximately 1 year ago, but was referred to hematology by per PCP for iron studies.\par \par

## 2023-10-09 NOTE — PRE-OP CHECKLIST - WEIGHT IN KG
18-year-old female presents to the ED with nausea vomiting and diarrhea.  Exam is nonfocal likely food poisoning no signs or concern for intra-abdominal pathology will check labs pregnancy symptom control reassess 18-year-old female presents to the ED with nausea vomiting and diarrhea.  Exam is nonfocal likely food poisoning no signs or concern for intra-abdominal pathology will check labs pregnancy symptom control reassess  737All labs imaging reviewed by myself.  Patient with elevated white count of 17.  Rest of the labs are unremarkable.  Vital signs are stable abdomen remains soft nontender patient is tolerating p.o.  Likely gastroenteritis versus food poisoning.  No signs of intra-abdominal pathology.  Patient comfortable going home will DC with follow-up and strict return precautions. 64.5

## 2024-10-21 NOTE — DISCHARGE NOTE NURSING/CASE MANAGEMENT/SOCIAL WORK - INFLUENZA IMMUNIZATION DATE (APPROXIMATE):
Patient has pessary - is flying to [place] tonight for an emergency, leaves at midnight. Requesting letter to be uploaded to her mychart stating she has pessary incase she is stopped at airport security.     ESC sent to Dr. Stack - letter uploaded to patient.     Outgoing call to patient, informed letter uploaded. Patient appreciative.    25-Nov-2019

## 2025-01-30 NOTE — H&P ADULT - NSICDXPASTMEDICALHX_GEN_ALL_CORE_FT
PAST MEDICAL HISTORY:  Anemia iron def  Iron infusion every 2 weeks    Smoker quit 1 month ago unable to assess due to seeming poverty of thought content/Unable to assess

## 2025-02-14 NOTE — PROGRESS NOTE ADULT - REASON FOR ADMISSION
Medication: see attached passed protocol.   Last office visit date: 11/2024  Next appointment scheduled?: Yes   Number of refills given: 1   
Refill request from Pharmacy     [facility] on file   
States that Rachel prescribed Mounjaro. Went to pharm to  and was advised that Rachel had cancelled script. Requested c/b to discuss. Please advise.  
laparoscopic distal pancreatectomy and splenectomy
Respiratory